# Patient Record
Sex: FEMALE
[De-identification: names, ages, dates, MRNs, and addresses within clinical notes are randomized per-mention and may not be internally consistent; named-entity substitution may affect disease eponyms.]

---

## 2018-12-06 PROBLEM — Z00.00 ENCOUNTER FOR PREVENTIVE HEALTH EXAMINATION: Status: ACTIVE | Noted: 2018-12-06

## 2018-12-13 ENCOUNTER — APPOINTMENT (OUTPATIENT)
Dept: CARDIOLOGY | Facility: CLINIC | Age: 73
End: 2018-12-13
Payer: MEDICARE

## 2018-12-13 ENCOUNTER — NON-APPOINTMENT (OUTPATIENT)
Age: 73
End: 2018-12-13

## 2018-12-13 VITALS
SYSTOLIC BLOOD PRESSURE: 158 MMHG | OXYGEN SATURATION: 96 % | WEIGHT: 156 LBS | BODY MASS INDEX: 33.66 KG/M2 | HEART RATE: 63 BPM | DIASTOLIC BLOOD PRESSURE: 72 MMHG | HEIGHT: 57 IN

## 2018-12-13 DIAGNOSIS — Z87.898 PERSONAL HISTORY OF OTHER SPECIFIED CONDITIONS: ICD-10-CM

## 2018-12-13 DIAGNOSIS — R09.89 OTHER SPECIFIED SYMPTOMS AND SIGNS INVOLVING THE CIRCULATORY AND RESPIRATORY SYSTEMS: ICD-10-CM

## 2018-12-13 DIAGNOSIS — F32.9 MAJOR DEPRESSIVE DISORDER, SINGLE EPISODE, UNSPECIFIED: ICD-10-CM

## 2018-12-13 DIAGNOSIS — R06.02 SHORTNESS OF BREATH: ICD-10-CM

## 2018-12-13 DIAGNOSIS — Z78.9 OTHER SPECIFIED HEALTH STATUS: ICD-10-CM

## 2018-12-13 PROCEDURE — 99204 OFFICE O/P NEW MOD 45 MIN: CPT

## 2018-12-13 PROCEDURE — 93000 ELECTROCARDIOGRAM COMPLETE: CPT

## 2018-12-13 RX ORDER — SERTRALINE HYDROCHLORIDE 100 MG/1
100 TABLET, FILM COATED ORAL DAILY
Qty: 90 | Refills: 0 | Status: ACTIVE | COMMUNITY

## 2018-12-13 RX ORDER — ANASTROZOLE TABLETS 1 MG/1
1 TABLET ORAL DAILY
Refills: 0 | Status: ACTIVE | COMMUNITY

## 2018-12-13 RX ORDER — QUINAPRIL HYDROCHLORIDE 5 MG/1
5 TABLET, FILM COATED ORAL
Qty: 90 | Refills: 1 | Status: ACTIVE | COMMUNITY

## 2018-12-13 RX ORDER — METOPROLOL SUCCINATE 50 MG/1
50 TABLET, EXTENDED RELEASE ORAL DAILY
Refills: 0 | Status: ACTIVE | COMMUNITY

## 2018-12-13 RX ORDER — VERAPAMIL HYDROCHLORIDE 240 MG/1
240 CAPSULE, DELAYED RELEASE PELLETS ORAL
Refills: 0 | Status: ACTIVE | COMMUNITY

## 2018-12-13 NOTE — ASSESSMENT
[FreeTextEntry1] : Reviewed on December 13, 2018.\par Labs from December 11, 2018.\par CBC and BMP were stable. PT/PTT normal.\par EKG. Ordered and interpreted by me. December 13, 2018indicationdyspnea on exertion. Interpretation. Normal sinus rhythm. ST-T wave changes. Probably nonspecific repolarization abnormality

## 2018-12-13 NOTE — HISTORY OF PRESENT ILLNESS
[FreeTextEntry1] : 73-year-old comes in for preoperative cardiac assessment prior to ENT procedure in presence of\par Long-standing history of essential hypertension without history of congestive heart failure, renal insufficiency.\par Obesity.\par History of breast cancer, status post surgery without complications.\par No prior history of myocardial infarction, syncope, cerebrovascular accident, peripheral arterial disease.\par Complain of dyspnea on exertion. Intermittent. Less than 1 mile walking. Without any associated chest pain. No palpitations. No PND, orthopnea, or pedal edema.\par No prior history of bleeding complications.\par No prior history of anesthesia related complications

## 2018-12-13 NOTE — PHYSICAL EXAM
[General Appearance - Well Developed] : well developed [Normal Appearance] : normal appearance [Well Groomed] : well groomed [General Appearance - Well Nourished] : well nourished [No Deformities] : no deformities [General Appearance - In No Acute Distress] : no acute distress [Normal Conjunctiva] : the conjunctiva exhibited no abnormalities [No Oral Pallor] : no oral pallor [Normal Jugular Venous A Waves Present] : normal jugular venous A waves present [Normal Jugular Venous V Waves Present] : normal jugular venous V waves present [No Jugular Venous Louise A Waves] : no jugular venous louise A waves [Normal] : normal [No Precordial Heave] : no precordial heave was noted [Normal Rate] : normal [Normal S1] : normal S1 [Normal S2] : normal S2 [S3] : no S3 [S4] : no S4 [No Murmur] : no murmurs heard [II] : a grade 2 [1+] : left 1+ [Right Carotid Bruit] : right carotid bruit heard [Left Carotid Bruit] : no bruit heard over the left carotid [Right Femoral Bruit] : no bruit heard over the right femoral artery [Left Femoral Bruit] : no bruit heard over the left femoral artery [2+] : left 2+ [No Abnormalities] : the abdominal aorta was not enlarged and no bruit was heard [No Pitting Edema] : no pitting edema present [Respiration, Rhythm And Depth] : normal respiratory rhythm and effort [Exaggerated Use Of Accessory Muscles For Inspiration] : no accessory muscle use [Auscultation Breath Sounds / Voice Sounds] : lungs were clear to auscultation bilaterally [Abdomen Soft] : soft [Abdomen Tenderness] : non-tender [Abdomen Mass (___ Cm)] : no abdominal mass palpated [FreeTextEntry1] : slow gait [Nail Clubbing] : no clubbing of the fingernails [Cyanosis, Localized] : no localized cyanosis [Petechial Hemorrhages (___cm)] : no petechial hemorrhages [Skin Color & Pigmentation] : normal skin color and pigmentation [] : no rash [No Venous Stasis] : no venous stasis [Skin Lesions] : no skin lesions [No Skin Ulcers] : no skin ulcer [No Xanthoma] : no  xanthoma was observed [Oriented To Time, Place, And Person] : oriented to person, place, and time [Affect] : the affect was normal [Mood] : the mood was normal [No Anxiety] : not feeling anxious

## 2018-12-13 NOTE — REVIEW OF SYSTEMS
[Shortness Of Breath] : shortness of breath [Dyspnea on exertion] : dyspnea during exertion [Chest  Pressure] : no chest pressure [Chest Pain] : no chest pain [Lower Ext Edema] : no extremity edema [Leg Claudication] : no intermittent leg claudication [Palpitations] : no palpitations [Negative] : Heme/Lymph

## 2018-12-13 NOTE — DISCUSSION/SUMMARY
[FreeTextEntry1] : 73 Years old, [place]ic-speaking female with exertional dyspnea, abnormal EKG, systolic murmur suggestive of mild aortic stenosis, carotid bruits, suggestive of carotid atherosclerosis, long-standing history of hypertension, which is uncontrolled, obesity, now to undergo left neck lymph node dissection.\par Considering the above risk factors, and symptoms, and presence of abnormal EKG with inability to do treadmill exercise. I have recommended her.\par Echocardiogram\par Carotid Doppler study.\par Pharmacological myocardial perfusion scan on medications to rule out any significant ischemic heart disease.\par Increase quinapril to 10 mg a day for better control of blood pressure.\par \par Counseling regarding low saturated fat, salt and carbohydrate intake was reviewed. Active lifestyle and regular. Exercise along with weight management is advised.\par All the above were at length reviewed. Answered all the questions. Thank you very much for this kind referral. Please do not hesitate to give me a call for any question.\par Part of this transcription was done with voice recognition software and phonetically similar errors are common. I apologize for that. Please donot hesitate to call for any questions due to above.\par \par Followup to complete preoperative assessment

## 2018-12-13 NOTE — REASON FOR VISIT
[Initial Evaluation] : an initial evaluation of [Abnormal ECG] : an abnormal ECG [Dyspnea] : dyspnea [Hypertension] : hypertension [Spouse] : spouse [Source: ______] : History obtained from [unfilled]

## 2018-12-14 ENCOUNTER — APPOINTMENT (OUTPATIENT)
Dept: CARDIOLOGY | Facility: CLINIC | Age: 73
End: 2018-12-14
Payer: MEDICARE

## 2018-12-14 DIAGNOSIS — K21.9 GASTRO-ESOPHAGEAL REFLUX DISEASE W/OUT ESOPHAGITIS: ICD-10-CM

## 2018-12-14 DIAGNOSIS — Z86.39 PERSONAL HISTORY OF OTHER ENDOCRINE, NUTRITIONAL AND METABOLIC DISEASE: ICD-10-CM

## 2018-12-14 PROCEDURE — 93015 CV STRESS TEST SUPVJ I&R: CPT

## 2018-12-14 PROCEDURE — 93306 TTE W/DOPPLER COMPLETE: CPT

## 2018-12-14 PROCEDURE — 78452 HT MUSCLE IMAGE SPECT MULT: CPT

## 2018-12-14 PROCEDURE — A9502: CPT

## 2018-12-17 ENCOUNTER — APPOINTMENT (OUTPATIENT)
Dept: CARDIOLOGY | Facility: CLINIC | Age: 73
End: 2018-12-17
Payer: MEDICARE

## 2018-12-17 PROCEDURE — 93880 EXTRACRANIAL BILAT STUDY: CPT

## 2018-12-18 ENCOUNTER — APPOINTMENT (OUTPATIENT)
Dept: CARDIOLOGY | Facility: CLINIC | Age: 73
End: 2018-12-18
Payer: MEDICARE

## 2018-12-18 VITALS
SYSTOLIC BLOOD PRESSURE: 134 MMHG | DIASTOLIC BLOOD PRESSURE: 62 MMHG | HEIGHT: 57 IN | WEIGHT: 158 LBS | BODY MASS INDEX: 34.09 KG/M2 | HEART RATE: 68 BPM

## 2018-12-18 DIAGNOSIS — I10 ESSENTIAL (PRIMARY) HYPERTENSION: ICD-10-CM

## 2018-12-18 DIAGNOSIS — Z01.810 ENCOUNTER FOR PREPROCEDURAL CARDIOVASCULAR EXAMINATION: ICD-10-CM

## 2018-12-18 DIAGNOSIS — R01.1 CARDIAC MURMUR, UNSPECIFIED: ICD-10-CM

## 2018-12-18 DIAGNOSIS — R94.31 ABNORMAL ELECTROCARDIOGRAM [ECG] [EKG]: ICD-10-CM

## 2018-12-18 PROCEDURE — 99214 OFFICE O/P EST MOD 30 MIN: CPT

## 2018-12-18 NOTE — PHYSICAL EXAM
[General Appearance - Well Developed] : well developed [Normal Appearance] : normal appearance [Well Groomed] : well groomed [General Appearance - Well Nourished] : well nourished [No Deformities] : no deformities [General Appearance - In No Acute Distress] : no acute distress [Normal Conjunctiva] : the conjunctiva exhibited no abnormalities [No Oral Pallor] : no oral pallor [Normal Jugular Venous A Waves Present] : normal jugular venous A waves present [Normal Jugular Venous V Waves Present] : normal jugular venous V waves present [No Jugular Venous Louise A Waves] : no jugular venous louise A waves [Respiration, Rhythm And Depth] : normal respiratory rhythm and effort [Exaggerated Use Of Accessory Muscles For Inspiration] : no accessory muscle use [Auscultation Breath Sounds / Voice Sounds] : lungs were clear to auscultation bilaterally [Abdomen Soft] : soft [Abdomen Tenderness] : non-tender [Abdomen Mass (___ Cm)] : no abdominal mass palpated [Nail Clubbing] : no clubbing of the fingernails [Cyanosis, Localized] : no localized cyanosis [Petechial Hemorrhages (___cm)] : no petechial hemorrhages [Skin Color & Pigmentation] : normal skin color and pigmentation [] : no rash [No Venous Stasis] : no venous stasis [Skin Lesions] : no skin lesions [No Skin Ulcers] : no skin ulcer [No Xanthoma] : no  xanthoma was observed [Oriented To Time, Place, And Person] : oriented to person, place, and time [Affect] : the affect was normal [Mood] : the mood was normal [No Anxiety] : not feeling anxious [Normal] : normal [No Precordial Heave] : no precordial heave was noted [Normal Rate] : normal [Normal S1] : normal S1 [Normal S2] : normal S2 [No Murmur] : no murmurs heard [II] : a grade 2 [1+] : left 1+ [Right Carotid Bruit] : right carotid bruit heard [2+] : left 2+ [No Abnormalities] : the abdominal aorta was not enlarged and no bruit was heard [No Pitting Edema] : no pitting edema present [FreeTextEntry1] : slow gait [S3] : no S3 [S4] : no S4 [Left Carotid Bruit] : no bruit heard over the left carotid [Right Femoral Bruit] : no bruit heard over the right femoral artery [Left Femoral Bruit] : no bruit heard over the left femoral artery

## 2018-12-18 NOTE — ASSESSMENT
[FreeTextEntry1] : \par Labs from December 11, 2018.\par CBC and BMP were stable. PT/PTT normal.\par EKG. Ordered and interpreted by me. December 13, 2018 indication dyspnea on exertion. Interpretation. Normal sinus rhythm. ST-T wave changes. Probably nonspecific repolarization abnormality

## 2018-12-18 NOTE — DISCUSSION/SUMMARY
[FreeTextEntry1] : 73 Years old, [place]-speaking female with exertional dyspnea, abnormal EKG, systolic murmur suggestive of mild aortic stenosis, carotid bruits, suggestive of carotid atherosclerosis, long-standing history of hypertension, which is uncontrolled, obesity, now to undergo left neck lymph node dissection.\par \par Results of echocardiogram, nuclear stress test, carotid ultrasound discussed with the patient. Interpretation to her daughter who is in the room. The patient denies any chest pains. Echocardiogram shows normal left ventricular function. Nuclear stress test reveals no evidence of ischemia. There was small apical fixed defect likely breast attenuation artifact. No evidence of any significant carotid disease. Overall patient is well compensated in no significant risk for cardiovascular event during anticipated low risk procedure. Patient is preop for deep incision of lymph node on the left side of the neck. is [facility].  May proceed without any delays.\par Continue, prevention. Cardiac followup yearly and as needed.

## 2018-12-18 NOTE — REVIEW OF SYSTEMS
[Shortness Of Breath] : shortness of breath [Dyspnea on exertion] : dyspnea during exertion [Negative] : Heme/Lymph [Chest  Pressure] : no chest pressure [Chest Pain] : no chest pain [Lower Ext Edema] : no extremity edema [Leg Claudication] : no intermittent leg claudication [Palpitations] : no palpitations

## 2018-12-18 NOTE — REASON FOR VISIT
[Follow-Up - Clinic] : a clinic follow-up of [Abnormal ECG] : an abnormal ECG [Dyspnea] : dyspnea [Hypertension] : hypertension [Spouse] : spouse [Source: ______] : History obtained from [unfilled]

## 2019-09-17 ENCOUNTER — APPOINTMENT (OUTPATIENT)
Dept: NUCLEAR MEDICINE | Facility: CLINIC | Age: 74
End: 2019-09-17

## 2019-11-19 ENCOUNTER — APPOINTMENT (OUTPATIENT)
Dept: CARDIOLOGY | Facility: CLINIC | Age: 74
End: 2019-11-19

## 2020-11-28 ENCOUNTER — OUTPATIENT (OUTPATIENT)
Dept: OUTPATIENT SERVICES | Facility: HOSPITAL | Age: 75
LOS: 1 days | End: 2020-11-28
Payer: MEDICARE

## 2020-11-28 DIAGNOSIS — Z11.59 ENCOUNTER FOR SCREENING FOR OTHER VIRAL DISEASES: ICD-10-CM

## 2020-11-28 LAB — SARS-COV-2 RNA SPEC QL NAA+PROBE: SIGNIFICANT CHANGE UP

## 2020-11-28 PROCEDURE — U0003: CPT

## 2020-11-29 DIAGNOSIS — Z11.59 ENCOUNTER FOR SCREENING FOR OTHER VIRAL DISEASES: ICD-10-CM
